# Patient Record
Sex: FEMALE | URBAN - METROPOLITAN AREA
[De-identification: names, ages, dates, MRNs, and addresses within clinical notes are randomized per-mention and may not be internally consistent; named-entity substitution may affect disease eponyms.]

---

## 2024-06-13 ENCOUNTER — OFFICE (OUTPATIENT)
Dept: URBAN - METROPOLITAN AREA CLINIC 25 | Facility: CLINIC | Age: 24
End: 2024-06-13

## 2024-06-13 VITALS
HEIGHT: 61 IN | SYSTOLIC BLOOD PRESSURE: 115 MMHG | HEART RATE: 81 BPM | DIASTOLIC BLOOD PRESSURE: 76 MMHG | WEIGHT: 109 LBS | TEMPERATURE: 98.6 F

## 2024-06-13 DIAGNOSIS — Z83.2 FAMILY HISTORY OF DISEASES OF THE BLOOD AND BLOOD-FORMING OR: ICD-10-CM

## 2024-06-13 DIAGNOSIS — K60.2 ANAL FISSURE, UNSPECIFIED: ICD-10-CM

## 2024-06-13 DIAGNOSIS — K59.00 CONSTIPATION, UNSPECIFIED: ICD-10-CM

## 2024-06-13 DIAGNOSIS — D50.9 IRON DEFICIENCY ANEMIA, UNSPECIFIED: ICD-10-CM

## 2024-06-13 DIAGNOSIS — K62.89 OTHER SPECIFIED DISEASES OF ANUS AND RECTUM: ICD-10-CM

## 2024-06-13 DIAGNOSIS — K92.1 MELENA: ICD-10-CM

## 2024-06-13 PROCEDURE — 99204 OFFICE O/P NEW MOD 45 MIN: CPT | Performed by: INTERNAL MEDICINE

## 2024-08-20 PROBLEM — K63.89 OTHER SPECIFIED DISEASES OF INTESTINE: Status: ACTIVE | Noted: 2024-08-20

## 2024-08-20 PROBLEM — K60.2 ANAL FISSURE, UNSPECIFIED: Status: ACTIVE | Noted: 2024-08-20

## 2024-09-26 ENCOUNTER — PATIENT OUTREACH (OUTPATIENT)
Dept: HEMATOLOGY/ONCOLOGY | Facility: CLINIC | Age: 24
End: 2024-09-26
Payer: COMMERCIAL

## 2024-09-26 NOTE — PROGRESS NOTES
Initial outreach call placed to patient.  No answer.  Message left on identifiable voicemail with call back instructions.

## 2024-10-01 ENCOUNTER — OFFICE VISIT (OUTPATIENT)
Dept: HEMATOLOGY/ONCOLOGY | Facility: CLINIC | Age: 24
End: 2024-10-01
Payer: COMMERCIAL

## 2024-10-01 VITALS
BODY MASS INDEX: 21.46 KG/M2 | HEART RATE: 77 BPM | RESPIRATION RATE: 16 BRPM | SYSTOLIC BLOOD PRESSURE: 108 MMHG | HEIGHT: 61 IN | DIASTOLIC BLOOD PRESSURE: 69 MMHG | OXYGEN SATURATION: 100 % | TEMPERATURE: 97.7 F | WEIGHT: 113.65 LBS

## 2024-10-01 DIAGNOSIS — D56.3 BETA THALASSEMIA TRAIT: Primary | ICD-10-CM

## 2024-10-01 PROCEDURE — 99213 OFFICE O/P EST LOW 20 MIN: CPT | Performed by: INTERNAL MEDICINE

## 2024-10-01 PROCEDURE — 99203 OFFICE O/P NEW LOW 30 MIN: CPT | Performed by: INTERNAL MEDICINE

## 2024-10-01 PROCEDURE — 1036F TOBACCO NON-USER: CPT | Performed by: INTERNAL MEDICINE

## 2024-10-01 PROCEDURE — 3008F BODY MASS INDEX DOCD: CPT | Performed by: INTERNAL MEDICINE

## 2024-10-01 RX ORDER — TOPIRAMATE 25 MG/1
25 TABLET ORAL EVERY MORNING
COMMUNITY
Start: 2024-07-24

## 2024-10-01 RX ORDER — BUPROPION HYDROCHLORIDE 150 MG/1
150 TABLET, EXTENDED RELEASE ORAL
COMMUNITY
Start: 2024-07-29

## 2024-10-01 RX ORDER — TOPIRAMATE 25 MG/1
50 TABLET ORAL DAILY
COMMUNITY

## 2024-10-01 RX ORDER — TAMSULOSIN HYDROCHLORIDE 0.4 MG/1
CAPSULE ORAL
COMMUNITY
Start: 2024-07-30

## 2024-10-01 RX ORDER — HYDROCORTISONE 25 MG/G
CREAM TOPICAL
COMMUNITY
Start: 2024-06-20

## 2024-10-01 RX ORDER — PROMETHAZINE HYDROCHLORIDE 25 MG/1
TABLET ORAL
COMMUNITY
Start: 2024-08-01

## 2024-10-01 RX ORDER — HYDROXYZINE PAMOATE 25 MG/1
CAPSULE ORAL
COMMUNITY
Start: 2024-05-08

## 2024-10-01 RX ORDER — FERROUS GLUCONATE 324(38)MG
1 TABLET ORAL EVERY OTHER DAY
COMMUNITY
Start: 2024-08-12

## 2024-10-01 RX ORDER — RIMEGEPANT SULFATE 75 MG/75MG
TABLET, ORALLY DISINTEGRATING ORAL
COMMUNITY
Start: 2024-08-14

## 2024-10-01 RX ORDER — FACIAL-BODY WIPES
EACH TOPICAL
COMMUNITY
Start: 2024-06-13

## 2024-10-01 RX ORDER — KETOROLAC TROMETHAMINE 10 MG/1
TABLET, FILM COATED ORAL
COMMUNITY
Start: 2024-07-30

## 2024-10-01 ASSESSMENT — COLUMBIA-SUICIDE SEVERITY RATING SCALE - C-SSRS
1. IN THE PAST MONTH, HAVE YOU WISHED YOU WERE DEAD OR WISHED YOU COULD GO TO SLEEP AND NOT WAKE UP?: NO
2. HAVE YOU ACTUALLY HAD ANY THOUGHTS OF KILLING YOURSELF?: NO
6. HAVE YOU EVER DONE ANYTHING, STARTED TO DO ANYTHING, OR PREPARED TO DO ANYTHING TO END YOUR LIFE?: NO

## 2024-10-01 ASSESSMENT — PATIENT HEALTH QUESTIONNAIRE - PHQ9
2. FEELING DOWN, DEPRESSED OR HOPELESS: NOT AT ALL
SUM OF ALL RESPONSES TO PHQ9 QUESTIONS 1 AND 2: 0
1. LITTLE INTEREST OR PLEASURE IN DOING THINGS: NOT AT ALL

## 2024-10-01 ASSESSMENT — ENCOUNTER SYMPTOMS
LOSS OF SENSATION IN FEET: 0
DEPRESSION: 0
OCCASIONAL FEELINGS OF UNSTEADINESS: 0

## 2024-10-01 ASSESSMENT — PAIN SCALES - GENERAL: PAINLEVEL: 0-NO PAIN

## 2024-10-01 NOTE — PROGRESS NOTES
Patient ID: Brianda Kim is a 24 y.o. female.  Referring Physician: No referring provider defined for this encounter.  Primary Care Provider: No primary care provider on file.      ORDERS & PATIENT INSTRUCTIONS:  Patient has beta thalassemia trait  No further workup needed at this time  No follow-up needed                CONSULTING PHYSICIAN:    Dr. 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 Cobb who recently underwent colonoscopy and was negative including localized small anal fissure and small internal hemorrhoid on 20 August 2024, she had a lab work done which showed microcytic anemia with WBC 7.2 hemoglobin 10.8, MCV 64 and platelet 252 with red blood cell count was 5.41 million so additional lab work including iron saturation was 24% ferritin 87 and hemoglobin electrophoresis revealed hemoglobin A was 94.5 and hemoglobin A2 was 5.5 so I been asked to further evaluate.    PAST MEDICAL HISTORY:  Anxiety, migraine, colonoscopy in August 2024 as per HPI  No past medical history on file.     No past surgical history on file.         CURRENT MEDICATION:  Current Outpatient Medications   Medication Sig Dispense Refill    buPROPion SR (Wellbutrin SR) 150 mg 12 hr tablet Take 1 tablet (150 mg) by mouth once daily in the morning. Take before meals.      ferrous gluconate 324 (38 Fe) MG tablet Take 1 tablet (324 mg) by mouth every other day.      Fiber Laxative, ca polycarbo, 625 mg tablet TAKE 3 TABLETS BY MOUTH 2 TIMES A DAY AS DIRECTED      hydrocortisone (Anusol-HC) 2.5 % rectal cream APPLY AS DIRECTED TWICE EVERY DAY FOR 2 WEEKS THEN AS NEEDED      hydrOXYzine pamoate (Vistaril) 25 mg capsule TAKE 1-2 TABLETS BY MOUTH AT BEDTIME AS NEEDED FOR INSOMNIA      ketorolac (Toradol) 10 mg tablet TAKE 1  "TABLET BY MOUTH EVERY 6 HOURS AS NEEDED FOR UP TO 5 DAYS.      Nurtec ODT 75 mg tablet,disintegrating       promethazine (Phenergan) 25 mg tablet TAKE ONE TABLET BY MOUTH EVERY 4 HOURS AS NEEDED FOR NAUSEA AND VOMITING      tamsulosin (Flomax) 0.4 mg 24 hr capsule TAKE 1 CAPSULE BY MOUTH EVERY DAY AT BEDTIME FOR 7 DAYS      topiramate (Topamax) 25 mg tablet Take 1 tablet (25 mg) by mouth once daily in the morning.      topiramate (Topamax) 25 mg tablet Take 2 tablets (50 mg) by mouth once daily. In evening       No current facility-administered medications for this visit.                 REVIEW OF SYSTEMS:    Patient denies any headache fever cough chest pain shortness of breath urinary symptoms, she does have occasional headache related to migraine and abdominal cramping and bright red blood per her rectum as per HPI.  Her menstruation comes every 4 to 6 weeks and last 4 to 7 days and occasionally is very heavy, no recent pregnancy no children, rest of the 10 review of system negative and as per HPI  PHYSICAL EXAM:  Objective   BSA: 1.49 meters squared,   /69   Pulse 77   Temp 36.5 °C (97.7 °F)   Resp 16   Ht 1.551 m (5' 1.06\")   Wt 51.5 kg (113 lb 10.4 oz)   SpO2 100%   BMI 21.43 kg/m²   Gen:  Conscious,  no acute distress,   HEENT: Normocephalic, no icterus. . No nasal discharge,  Oral mucosa moist  Chest: Bilateral symmetrical, Bilateral AE        CVS: S1S2.   Abdomen: Soft, no guarding or rigidity BS+   Extremities: No C/C   Skin: No petechiae          LABS:    CBC:No results for input(s): \"WBC\", \"ANC\", \"HGB\", \"HCT\", \"PLT\", \"MCV\" in the last 01506 hours.    No lab exists for component: \"DIF\"    CMP:No results for input(s): \"NA\", \"K\", \"CL\", \"CO2\", \"ANIONGAP\", \"BUN\", \"CREATININE\", \"EGFR\", \"MG\" in the last 49007 hours.No results for input(s): \"ALBUMIN\", \"ALKPHOS\", \"ALT\", \"AST\", \"BILITOT\", \"LIPASE\" in the last 69931 hours.    No lab exists for component: \"CA\"    HEME/ENDO:No results for input(s): " "\"FERRITIN\", \"IRONSAT\", \"TSH\", \"CMOZCOZX48\", \"FOLATE\" in the last 95411 hours.     MICRO: No results for input(s): \"ESR\", \"CRP\", \"PROCAL\" in the last 85257 hours.  No results found for the last 90 days.        TUMOR MARKERS:  No results found for: \"LABCA2\", \"CEA\", \"\", \"PSA\", \"AFPTM\", \"HCGTM\", \"\"             IMAGING:         No image results found.               SOCIAL HISTORY:    has no history on file for alcohol use.   has no history on file for drug use.,   Tobacco Use: Low Risk  (8/20/2024)    Received from [facility]    Patient History     Smoking Tobacco Use: Never     Smokeless Tobacco Use: Never     Passive Exposure: Not on file   Denies smoking or illicit drug use, occasional alcohol    FAMILY HISTORY:  No family history on file.  Mother and maternal grandfather had thalassemia  ALLERGY:   Patient has no known allergies.    [facility]      ASSESSMENT & PLAN:  Patient is a 24-year-old woman of  ancestry who has hemoglobin electrophoresis showing elevated hemoglobin A2 which is consistent with beta thalassemia trait.  I explained to the patient that she would not require any additional workup or treatment as this is a genetic disorder at the same time I have advised her to avoid iron tablet unless she has documented iron deficiency.  I have also done reproductive counseling and explained that her  should also have at least CBC to make sure he also does not have possibility of thalassemia especially if that is the case then her offspring could potentially have thalassemia major but otherwise I do not believe there would be any major issues.  At this time I will not give any follow-up appointment but I will be happy to answer any questions she may have in the future.       labs reviewed and interpreted independently, X rays independently reviewed  Notes from other physicians involved in care were reviewed      Thank you very much for allowing me to participate in care of " this patient, should you have any further question please do not hesitate to contact me.    Charting was completed using voice recognition technology and may include unintended errors.    NILSA CONTRERAS MD, GRETA.    Louie Carter Master Clinician in Hematology and Oncology  Medical Director, [facility].  [place] office  Phone [phone]  Fax      [phone]    [facility] /[place].  Phone [phone]  Fax     [phone]